# Patient Record
Sex: MALE | Race: WHITE | URBAN - METROPOLITAN AREA
[De-identification: names, ages, dates, MRNs, and addresses within clinical notes are randomized per-mention and may not be internally consistent; named-entity substitution may affect disease eponyms.]

---

## 2019-06-14 ENCOUNTER — OFFICE VISIT (OUTPATIENT)
Dept: PRIMARY CARE CLINIC | Age: 29
End: 2019-06-14
Payer: COMMERCIAL

## 2019-06-14 VITALS
HEIGHT: 75 IN | WEIGHT: 169 LBS | BODY MASS INDEX: 21.01 KG/M2 | DIASTOLIC BLOOD PRESSURE: 80 MMHG | SYSTOLIC BLOOD PRESSURE: 110 MMHG | HEART RATE: 70 BPM

## 2019-06-14 DIAGNOSIS — M26.623 BILATERAL TEMPOROMANDIBULAR JOINT PAIN: ICD-10-CM

## 2019-06-14 DIAGNOSIS — Z00.00 PHYSICAL EXAM: Primary | ICD-10-CM

## 2019-06-14 DIAGNOSIS — Z30.09 ENCOUNTER FOR VASECTOMY COUNSELING: ICD-10-CM

## 2019-06-14 PROCEDURE — 99385 PREV VISIT NEW AGE 18-39: CPT | Performed by: INTERNAL MEDICINE

## 2019-06-14 ASSESSMENT — PATIENT HEALTH QUESTIONNAIRE - PHQ9
SUM OF ALL RESPONSES TO PHQ QUESTIONS 1-9: 0
1. LITTLE INTEREST OR PLEASURE IN DOING THINGS: 0
SUM OF ALL RESPONSES TO PHQ9 QUESTIONS 1 & 2: 0
SUM OF ALL RESPONSES TO PHQ QUESTIONS 1-9: 0
2. FEELING DOWN, DEPRESSED OR HOPELESS: 0

## 2019-06-14 ASSESSMENT — ENCOUNTER SYMPTOMS
CONSTIPATION: 0
WHEEZING: 0
CHEST TIGHTNESS: 0
GASTROINTESTINAL NEGATIVE: 1
COUGH: 0
SHORTNESS OF BREATH: 0
EYES NEGATIVE: 1
BLOOD IN STOOL: 0
ANAL BLEEDING: 0

## 2019-06-14 NOTE — PROGRESS NOTES
Subjective:      Patient ID: Jevon Muller is a 33 y.o. male. 6/14/19 Patient presents with:  New Patient    Wants to see Urology for Vasectomy . Is adopted      TMJ issues        Review of Systems   Constitutional: Negative for chills, fatigue and fever. All Vaccines up to date     tdap 9/18    Flu Vac 9/18   HENT: Negative. Dental care reg     Ch TMJ issues   Eyes: Negative. Negative for visual disturbance. Eye exam >>   Respiratory: Negative for cough, chest tightness, shortness of breath and wheezing. Does not smoke . Occ Etoh . No rec drugs   No asthma    Cardiovascular: Negative. Negative for chest pain. No FH of HTN / CAD    Gastrointestinal: Negative. Negative for anal bleeding, blood in stool and constipation. No FH    Genitourinary: Negative for dysuria, frequency and urgency. One Son 9 mths   Musculoskeletal: Negative. Skin: Negative for rash. Allergic/Immunologic: Positive for environmental allergies. Negative for food allergies. Neurological: Negative for dizziness, tremors, weakness, light-headedness and headaches. Psychiatric/Behavioral: Negative for behavioral problems and sleep disturbance. The patient is not nervous/anxious. Objective:   Physical Exam   Constitutional: He is oriented to person, place, and time. He appears well-nourished. No distress. HENT:   Right Ear: External ear normal.   Nose: Nose normal.   Eyes: Pupils are equal, round, and reactive to light. Conjunctivae and EOM are normal.   Neck: Normal range of motion. Neck supple. No thyromegaly present. Cardiovascular: Normal rate, regular rhythm and normal heart sounds. Pulmonary/Chest: Breath sounds normal.   Abdominal: Soft. Bowel sounds are normal. There is no tenderness. Musculoskeletal: Normal range of motion. He exhibits no edema or deformity. Neurological: He is alert and oriented to person, place, and time. He has normal strength.  No sensory deficit. Skin: Skin is warm and dry. Psychiatric: He has a normal mood and affect. His behavior is normal. Judgment normal.   Nursing note and vitals reviewed. Assessment:      Raquel Prakash was seen today for new patient. Diagnoses and all orders for this visit:    Physical exam  -     Comprehensive Metabolic Panel; Future  -     CBC Auto Differential; Future  -     Urinalysis; Future    Encounter for vasectomy counseling  -     Camron Church MD, [facility], [facility]    Bilateral temporomandibular joint pain  -     Sedimentation Rate;  Future            Plan:              Eliud Young MD

## 2021-06-29 ENCOUNTER — OFFICE VISIT (OUTPATIENT)
Dept: PRIMARY CARE CLINIC | Age: 31
End: 2021-06-29
Payer: COMMERCIAL

## 2021-06-29 VITALS
BODY MASS INDEX: 22.98 KG/M2 | DIASTOLIC BLOOD PRESSURE: 71 MMHG | HEART RATE: 82 BPM | SYSTOLIC BLOOD PRESSURE: 127 MMHG | WEIGHT: 184.8 LBS | HEIGHT: 75 IN | OXYGEN SATURATION: 97 % | TEMPERATURE: 98.8 F

## 2021-06-29 DIAGNOSIS — Z00.00 PHYSICAL EXAM: Primary | ICD-10-CM

## 2021-06-29 PROCEDURE — 99395 PREV VISIT EST AGE 18-39: CPT | Performed by: INTERNAL MEDICINE

## 2021-06-29 SDOH — ECONOMIC STABILITY: FOOD INSECURITY: WITHIN THE PAST 12 MONTHS, YOU WORRIED THAT YOUR FOOD WOULD RUN OUT BEFORE YOU GOT MONEY TO BUY MORE.: NEVER TRUE

## 2021-06-29 SDOH — ECONOMIC STABILITY: FOOD INSECURITY: WITHIN THE PAST 12 MONTHS, THE FOOD YOU BOUGHT JUST DIDN'T LAST AND YOU DIDN'T HAVE MONEY TO GET MORE.: NEVER TRUE

## 2021-06-29 ASSESSMENT — ENCOUNTER SYMPTOMS
CHEST TIGHTNESS: 0
WHEEZING: 0
COUGH: 0
ANAL BLEEDING: 0
CONSTIPATION: 0
EYES NEGATIVE: 1
BLOOD IN STOOL: 0
SHORTNESS OF BREATH: 0
GASTROINTESTINAL NEGATIVE: 1

## 2021-06-29 ASSESSMENT — PATIENT HEALTH QUESTIONNAIRE - PHQ9
2. FEELING DOWN, DEPRESSED OR HOPELESS: 0
SUM OF ALL RESPONSES TO PHQ QUESTIONS 1-9: 0
1. LITTLE INTEREST OR PLEASURE IN DOING THINGS: 0
SUM OF ALL RESPONSES TO PHQ9 QUESTIONS 1 & 2: 0
SUM OF ALL RESPONSES TO PHQ QUESTIONS 1-9: 0
SUM OF ALL RESPONSES TO PHQ QUESTIONS 1-9: 0

## 2021-06-29 ASSESSMENT — SOCIAL DETERMINANTS OF HEALTH (SDOH): HOW HARD IS IT FOR YOU TO PAY FOR THE VERY BASICS LIKE FOOD, HOUSING, MEDICAL CARE, AND HEATING?: NOT HARD AT ALL

## 2021-06-29 NOTE — PROGRESS NOTES
Subjective:      Patient ID: Priya Babb is a 31 y.o. male. 6/29/2021 Patient presents with: Annual Exam  Otalgia: left ear echo for a couple months  Shoulder Pain: right side for almost 1 year- unable to sleep               Last seen  6/14/19 Patient presents with:  New Patient    Wants to see Urology for Vasectomy . Is adopted      TMJ issues     Otalgia   Pertinent negatives include no coughing, headaches or rash. Shoulder Pain   Pertinent negatives include no fever. Review of Systems   Constitutional: Negative for chills, fatigue and fever. All Vaccines up to date     tdap 9/18    Flu Vac   Covid 4/21   HENT: Ear pain: TMJ. Dental care reg     Ch TMJ issues   Eyes: Negative. Negative for visual disturbance. Eye exam 2/21   Respiratory: Negative for cough, chest tightness, shortness of breath and wheezing. Does not smoke . Occ Etoh . No rec drugs   No asthma    Cardiovascular: Negative. Negative for chest pain. No FH of HTN / CAD    Gastrointestinal: Negative. Negative for anal bleeding, blood in stool and constipation. No FH    Genitourinary: Negative for dysuria, frequency and urgency. One Son 9 mths   Musculoskeletal: Negative. Skin: Negative for rash. Allergic/Immunologic: Positive for environmental allergies. Negative for food allergies. Neurological: Negative for dizziness, tremors, weakness, light-headedness and headaches. Psychiatric/Behavioral: Negative for behavioral problems and sleep disturbance. The patient is not nervous/anxious. Objective:   Physical Exam  Vitals and nursing note reviewed. Constitutional:       General: He is not in acute distress. Eyes:      Extraocular Movements: Extraocular movements intact. Conjunctiva/sclera: Conjunctivae normal.   Neck:      Thyroid: No thyromegaly. Cardiovascular:      Rate and Rhythm: Normal rate and regular rhythm.       Heart sounds: Normal heart sounds. Pulmonary:      Breath sounds: Normal breath sounds. Abdominal:      General: Bowel sounds are normal.      Palpations: Abdomen is soft. Musculoskeletal:         General: No deformity. Normal range of motion. Cervical back: Normal range of motion and neck supple. Skin:     General: Skin is warm and dry. Neurological:      Mental Status: He is alert and oriented to person, place, and time. Psychiatric:         Mood and Affect: Mood normal.         Behavior: Behavior normal.         Judgment: Judgment normal.         Assessment:      Rosanne Hernandez was seen today for annual exam, otalgia and shoulder pain. Diagnoses and all orders for this visit:    Physical exam  WNL   -     CBC Auto Differential; Future  -     Comprehensive Metabolic Panel; Future  -     Lipid Panel; Future  -     TSH without Reflex; Future  -     Urinalysis; Future  -     Vitamin D 25 Hydroxy;  Future          Bilateral temporomandibular joint pain  Still waiting to see Specialist   -            Plan:              Alec Corley MD

## 2021-07-01 RX ORDER — ERGOCALCIFEROL 1.25 MG/1
50000 CAPSULE ORAL WEEKLY
Qty: 12 CAPSULE | Refills: 1 | Status: SHIPPED | OUTPATIENT
Start: 2021-07-01